# Patient Record
Sex: FEMALE | Race: WHITE | NOT HISPANIC OR LATINO | URBAN - METROPOLITAN AREA
[De-identification: names, ages, dates, MRNs, and addresses within clinical notes are randomized per-mention and may not be internally consistent; named-entity substitution may affect disease eponyms.]

---

## 2017-04-05 ENCOUNTER — EMERGENCY (EMERGENCY)
Facility: HOSPITAL | Age: 82
LOS: 1 days | Discharge: HOME | End: 2017-04-05
Attending: EMERGENCY MEDICINE | Admitting: EMERGENCY MEDICINE
Payer: MEDICARE

## 2017-04-05 VITALS
HEART RATE: 54 BPM | RESPIRATION RATE: 16 BRPM | SYSTOLIC BLOOD PRESSURE: 112 MMHG | OXYGEN SATURATION: 96 % | TEMPERATURE: 98 F | DIASTOLIC BLOOD PRESSURE: 62 MMHG

## 2017-04-05 VITALS
HEART RATE: 53 BPM | TEMPERATURE: 97 F | OXYGEN SATURATION: 99 % | RESPIRATION RATE: 17 BRPM | SYSTOLIC BLOOD PRESSURE: 116 MMHG | DIASTOLIC BLOOD PRESSURE: 72 MMHG

## 2017-04-05 DIAGNOSIS — Z98.89 OTHER SPECIFIED POSTPROCEDURAL STATES: Chronic | ICD-10-CM

## 2017-04-05 DIAGNOSIS — Z79.899 OTHER LONG TERM (CURRENT) DRUG THERAPY: ICD-10-CM

## 2017-04-05 DIAGNOSIS — Z04.3 ENCOUNTER FOR EXAMINATION AND OBSERVATION FOLLOWING OTHER ACCIDENT: ICD-10-CM

## 2017-04-05 DIAGNOSIS — I10 ESSENTIAL (PRIMARY) HYPERTENSION: ICD-10-CM

## 2017-04-05 LAB
ALBUMIN SERPL ELPH-MCNC: 3.4 G/DL — SIGNIFICANT CHANGE UP (ref 3.4–5)
ALP SERPL-CCNC: 77 U/L — SIGNIFICANT CHANGE UP (ref 40–120)
ALT FLD-CCNC: 21 U/L — SIGNIFICANT CHANGE UP (ref 12–42)
ANION GAP SERPL CALC-SCNC: 6 MMOL/L — LOW (ref 9–16)
AST SERPL-CCNC: 10 U/L — LOW (ref 15–37)
BASOPHILS NFR BLD AUTO: 0.2 % — SIGNIFICANT CHANGE UP (ref 0–2)
BILIRUB SERPL-MCNC: 0.3 MG/DL — SIGNIFICANT CHANGE UP (ref 0.2–1.2)
BUN SERPL-MCNC: 40 MG/DL — HIGH (ref 7–23)
CALCIUM SERPL-MCNC: 8.5 MG/DL — SIGNIFICANT CHANGE UP (ref 8.5–10.5)
CHLORIDE SERPL-SCNC: 105 MMOL/L — SIGNIFICANT CHANGE UP (ref 96–108)
CK MB CFR SERPL CALC: 2.1 NG/ML — SIGNIFICANT CHANGE UP (ref 0.5–3.6)
CK SERPL-CCNC: 74 U/L — SIGNIFICANT CHANGE UP (ref 26–192)
CO2 SERPL-SCNC: 27 MMOL/L — SIGNIFICANT CHANGE UP (ref 22–31)
CREAT SERPL-MCNC: 1.31 MG/DL — HIGH (ref 0.5–1.3)
EOSINOPHIL NFR BLD AUTO: 2.1 % — SIGNIFICANT CHANGE UP (ref 0–6)
GLUCOSE SERPL-MCNC: 86 MG/DL — SIGNIFICANT CHANGE UP (ref 70–99)
HCT VFR BLD CALC: 29.8 % — LOW (ref 34.5–45)
HGB BLD-MCNC: 10.2 G/DL — LOW (ref 11.5–15.5)
LYMPHOCYTES # BLD AUTO: 26.8 % — SIGNIFICANT CHANGE UP (ref 13–44)
MCHC RBC-ENTMCNC: 29 PG — SIGNIFICANT CHANGE UP (ref 27–34)
MCHC RBC-ENTMCNC: 34.2 G/DL — SIGNIFICANT CHANGE UP (ref 32–36)
MCV RBC AUTO: 84.7 FL — SIGNIFICANT CHANGE UP (ref 80–100)
MONOCYTES NFR BLD AUTO: 10.4 % — SIGNIFICANT CHANGE UP (ref 2–14)
NEUTROPHILS NFR BLD AUTO: 60.5 % — SIGNIFICANT CHANGE UP (ref 43–77)
PLATELET # BLD AUTO: 253 K/UL — SIGNIFICANT CHANGE UP (ref 150–400)
POTASSIUM SERPL-MCNC: 4.5 MMOL/L — SIGNIFICANT CHANGE UP (ref 3.5–5.3)
POTASSIUM SERPL-SCNC: 4.5 MMOL/L — SIGNIFICANT CHANGE UP (ref 3.5–5.3)
PROT SERPL-MCNC: 7.1 G/DL — SIGNIFICANT CHANGE UP (ref 6.4–8.2)
RBC # BLD: 3.52 M/UL — LOW (ref 3.8–5.2)
RBC # FLD: 13.3 % — SIGNIFICANT CHANGE UP (ref 10.3–16.9)
SODIUM SERPL-SCNC: 138 MMOL/L — SIGNIFICANT CHANGE UP (ref 135–145)
TROPONIN I SERPL-MCNC: <0.015 NG/ML — SIGNIFICANT CHANGE UP (ref 0.01–0.04)
WBC # BLD: 5.2 K/UL — SIGNIFICANT CHANGE UP (ref 3.8–10.5)
WBC # FLD AUTO: 5.2 K/UL — SIGNIFICANT CHANGE UP (ref 3.8–10.5)

## 2017-04-05 PROCEDURE — 99285 EMERGENCY DEPT VISIT HI MDM: CPT | Mod: 25

## 2017-04-05 PROCEDURE — 80053 COMPREHEN METABOLIC PANEL: CPT

## 2017-04-05 PROCEDURE — 84484 ASSAY OF TROPONIN QUANT: CPT

## 2017-04-05 PROCEDURE — 82550 ASSAY OF CK (CPK): CPT

## 2017-04-05 PROCEDURE — 93005 ELECTROCARDIOGRAM TRACING: CPT

## 2017-04-05 PROCEDURE — 85025 COMPLETE CBC W/AUTO DIFF WBC: CPT

## 2017-04-05 PROCEDURE — 36415 COLL VENOUS BLD VENIPUNCTURE: CPT

## 2017-04-05 PROCEDURE — 70450 CT HEAD/BRAIN W/O DYE: CPT

## 2017-04-05 PROCEDURE — 70450 CT HEAD/BRAIN W/O DYE: CPT | Mod: 26

## 2017-04-05 PROCEDURE — 93010 ELECTROCARDIOGRAM REPORT: CPT

## 2017-04-05 PROCEDURE — 99284 EMERGENCY DEPT VISIT MOD MDM: CPT | Mod: 25

## 2017-04-05 PROCEDURE — 82553 CREATINE MB FRACTION: CPT

## 2017-04-05 NOTE — ED ADULT TRIAGE NOTE - CHIEF COMPLAINT QUOTE
pt was sent to Er due to aid finding patient on the floor, EMS stated that patient was hypertensive on scene but patient has not history of htn and is not hypertensive now.

## 2017-04-05 NOTE — ED PROVIDER NOTE - MUSCULOSKELETAL, MLM
Spine appears normal, no midline spine ttp, no deformity of ext (baseline contracture R knee in flexion), range of motion is not limited, no muscle or joint tenderness

## 2017-04-05 NOTE — ED PROVIDER NOTE - OBJECTIVE STATEMENT
89 yo female h/o dementia, htn brought by hha who woke up and found pt sitting on the floor.  Pt unable to recall what happened.  Per hha, pt often slides off furniture to floor; no evidence of trauma.  HHA did not hear pt fall.  Pt denies injury - denies hitting head, ha, neck/back/ext pain or injury, cp, palpitations, sob, abd pain, n/v/d.  No fever, uri sx per hha.  MS at baseline per hha.

## 2017-04-05 NOTE — ED PROVIDER NOTE - MEDICAL DECISION MAKING DETAILS
Pt found on floor, likely slid off furniture.  Pt denies chi but will check ct head since pt w dementia and poor recall.  Labs, ekg wnl.  No concern for cardiac event, infection.  If ct head neg, plan dc home to f/u pmd.

## 2017-04-05 NOTE — ED ADULT NURSE NOTE - OBJECTIVE STATEMENT
89 y/o female c/o fall today when trying to get into wheelchair. pt's aide reports finding pt on the floor. pt PMH dementia and unable to provide medical history. pt oriented to person and place, disoriented to time. pt denies any dizziness, weakness, numbness/tingling, n/v/d, chest pain, SOB, headaches, LOC, head injury. no acute distress, VS stable, resting comfortably in stretcher. EKG done and MD reviewed. continue to monitor.

## 2017-04-05 NOTE — ED ADULT NURSE NOTE - CHPI ED SYMPTOMS NEG
no bleeding/no numbness/no loss of consciousness/no tingling/no confusion/no weakness/no abrasion/no deformity/no fever/no vomiting

## 2019-08-07 ENCOUNTER — EMERGENCY (EMERGENCY)
Facility: HOSPITAL | Age: 84
LOS: 1 days | Discharge: HOME | End: 2019-08-07
Attending: EMERGENCY MEDICINE | Admitting: EMERGENCY MEDICINE
Payer: MEDICARE

## 2019-08-07 VITALS
OXYGEN SATURATION: 99 % | RESPIRATION RATE: 16 BRPM | DIASTOLIC BLOOD PRESSURE: 64 MMHG | HEART RATE: 64 BPM | TEMPERATURE: 98 F | SYSTOLIC BLOOD PRESSURE: 146 MMHG

## 2019-08-07 VITALS
DIASTOLIC BLOOD PRESSURE: 82 MMHG | OXYGEN SATURATION: 100 % | SYSTOLIC BLOOD PRESSURE: 161 MMHG | HEART RATE: 60 BPM | TEMPERATURE: 98 F | RESPIRATION RATE: 18 BRPM

## 2019-08-07 DIAGNOSIS — Z98.89 OTHER SPECIFIED POSTPROCEDURAL STATES: Chronic | ICD-10-CM

## 2019-08-07 LAB
ALBUMIN SERPL ELPH-MCNC: 4 G/DL — SIGNIFICANT CHANGE UP (ref 3.3–5)
ALP SERPL-CCNC: 55 U/L — SIGNIFICANT CHANGE UP (ref 40–120)
ALT FLD-CCNC: 10 U/L — SIGNIFICANT CHANGE UP (ref 10–45)
ANION GAP SERPL CALC-SCNC: 10 MMOL/L — SIGNIFICANT CHANGE UP (ref 5–17)
ANION GAP SERPL CALC-SCNC: 11 MMOL/L — SIGNIFICANT CHANGE UP (ref 5–17)
APPEARANCE UR: ABNORMAL
AST SERPL-CCNC: 16 U/L — SIGNIFICANT CHANGE UP (ref 10–40)
BASOPHILS # BLD AUTO: 0.03 K/UL — SIGNIFICANT CHANGE UP (ref 0–0.2)
BASOPHILS NFR BLD AUTO: 0.7 % — SIGNIFICANT CHANGE UP (ref 0–2)
BILIRUB SERPL-MCNC: 0.6 MG/DL — SIGNIFICANT CHANGE UP (ref 0.2–1.2)
BILIRUB UR-MCNC: NEGATIVE — SIGNIFICANT CHANGE UP
BUN SERPL-MCNC: 30 MG/DL — HIGH (ref 7–23)
BUN SERPL-MCNC: 31 MG/DL — HIGH (ref 7–23)
CALCIUM SERPL-MCNC: 8.7 MG/DL — SIGNIFICANT CHANGE UP (ref 8.4–10.5)
CALCIUM SERPL-MCNC: 9.5 MG/DL — SIGNIFICANT CHANGE UP (ref 8.4–10.5)
CHLORIDE SERPL-SCNC: 110 MMOL/L — HIGH (ref 96–108)
CHLORIDE SERPL-SCNC: 112 MMOL/L — HIGH (ref 96–108)
CO2 SERPL-SCNC: 23 MMOL/L — SIGNIFICANT CHANGE UP (ref 22–31)
CO2 SERPL-SCNC: 26 MMOL/L — SIGNIFICANT CHANGE UP (ref 22–31)
COLOR SPEC: YELLOW — SIGNIFICANT CHANGE UP
CREAT SERPL-MCNC: 0.93 MG/DL — SIGNIFICANT CHANGE UP (ref 0.5–1.3)
CREAT SERPL-MCNC: 1.04 MG/DL — SIGNIFICANT CHANGE UP (ref 0.5–1.3)
DIFF PNL FLD: ABNORMAL
EOSINOPHIL # BLD AUTO: 0.05 K/UL — SIGNIFICANT CHANGE UP (ref 0–0.5)
EOSINOPHIL NFR BLD AUTO: 1.1 % — SIGNIFICANT CHANGE UP (ref 0–6)
EXTRA BLUE TOP TUBE: SIGNIFICANT CHANGE UP
EXTRA SST TUBE: SIGNIFICANT CHANGE UP
GAS PNL BLDV: SIGNIFICANT CHANGE UP
GLUCOSE SERPL-MCNC: 88 MG/DL — SIGNIFICANT CHANGE UP (ref 70–99)
GLUCOSE SERPL-MCNC: 95 MG/DL — SIGNIFICANT CHANGE UP (ref 70–99)
GLUCOSE UR QL: NEGATIVE — SIGNIFICANT CHANGE UP
HCT VFR BLD CALC: 31 % — LOW (ref 34.5–45)
HGB BLD-MCNC: 10.3 G/DL — LOW (ref 11.5–15.5)
IMM GRANULOCYTES NFR BLD AUTO: 0.2 % — SIGNIFICANT CHANGE UP (ref 0–1.5)
KETONES UR-MCNC: NEGATIVE — SIGNIFICANT CHANGE UP
LEUKOCYTE ESTERASE UR-ACNC: ABNORMAL
LYMPHOCYTES # BLD AUTO: 1.18 K/UL — SIGNIFICANT CHANGE UP (ref 1–3.3)
LYMPHOCYTES # BLD AUTO: 27.1 % — SIGNIFICANT CHANGE UP (ref 13–44)
MCHC RBC-ENTMCNC: 29.3 PG — SIGNIFICANT CHANGE UP (ref 27–34)
MCHC RBC-ENTMCNC: 33.2 GM/DL — SIGNIFICANT CHANGE UP (ref 32–36)
MCV RBC AUTO: 88.1 FL — SIGNIFICANT CHANGE UP (ref 80–100)
MONOCYTES # BLD AUTO: 0.48 K/UL — SIGNIFICANT CHANGE UP (ref 0–0.9)
MONOCYTES NFR BLD AUTO: 11 % — SIGNIFICANT CHANGE UP (ref 2–14)
NEUTROPHILS # BLD AUTO: 2.6 K/UL — SIGNIFICANT CHANGE UP (ref 1.8–7.4)
NEUTROPHILS NFR BLD AUTO: 59.9 % — SIGNIFICANT CHANGE UP (ref 43–77)
NITRITE UR-MCNC: POSITIVE
NRBC # BLD: 0 /100 WBCS — SIGNIFICANT CHANGE UP (ref 0–0)
PH UR: 6 — SIGNIFICANT CHANGE UP (ref 5–8)
PLATELET # BLD AUTO: 242 K/UL — SIGNIFICANT CHANGE UP (ref 150–400)
POTASSIUM SERPL-MCNC: 4 MMOL/L — SIGNIFICANT CHANGE UP (ref 3.5–5.3)
POTASSIUM SERPL-MCNC: 4.4 MMOL/L — SIGNIFICANT CHANGE UP (ref 3.5–5.3)
POTASSIUM SERPL-SCNC: 4 MMOL/L — SIGNIFICANT CHANGE UP (ref 3.5–5.3)
POTASSIUM SERPL-SCNC: 4.4 MMOL/L — SIGNIFICANT CHANGE UP (ref 3.5–5.3)
PROT SERPL-MCNC: 7.4 G/DL — SIGNIFICANT CHANGE UP (ref 6–8.3)
PROT UR-MCNC: NEGATIVE MG/DL — SIGNIFICANT CHANGE UP
RBC # BLD: 3.52 M/UL — LOW (ref 3.8–5.2)
RBC # FLD: 13.9 % — SIGNIFICANT CHANGE UP (ref 10.3–14.5)
SODIUM SERPL-SCNC: 145 MMOL/L — SIGNIFICANT CHANGE UP (ref 135–145)
SODIUM SERPL-SCNC: 147 MMOL/L — HIGH (ref 135–145)
SP GR SPEC: 1.01 — SIGNIFICANT CHANGE UP (ref 1–1.03)
TROPONIN T SERPL-MCNC: 0.02 NG/ML — HIGH (ref 0–0.01)
UROBILINOGEN FLD QL: 0.2 E.U./DL — SIGNIFICANT CHANGE UP
WBC # BLD: 4.35 K/UL — SIGNIFICANT CHANGE UP (ref 3.8–10.5)
WBC # FLD AUTO: 4.35 K/UL — SIGNIFICANT CHANGE UP (ref 3.8–10.5)

## 2019-08-07 PROCEDURE — 99285 EMERGENCY DEPT VISIT HI MDM: CPT | Mod: 25

## 2019-08-07 PROCEDURE — 80048 BASIC METABOLIC PNL TOTAL CA: CPT

## 2019-08-07 PROCEDURE — 96374 THER/PROPH/DIAG INJ IV PUSH: CPT | Mod: XU

## 2019-08-07 PROCEDURE — 82962 GLUCOSE BLOOD TEST: CPT

## 2019-08-07 PROCEDURE — 70450 CT HEAD/BRAIN W/O DYE: CPT | Mod: 26,59

## 2019-08-07 PROCEDURE — 87186 SC STD MICRODIL/AGAR DIL: CPT

## 2019-08-07 PROCEDURE — 82330 ASSAY OF CALCIUM: CPT

## 2019-08-07 PROCEDURE — 93005 ELECTROCARDIOGRAM TRACING: CPT

## 2019-08-07 PROCEDURE — 82803 BLOOD GASES ANY COMBINATION: CPT

## 2019-08-07 PROCEDURE — 85025 COMPLETE CBC W/AUTO DIFF WBC: CPT

## 2019-08-07 PROCEDURE — 36415 COLL VENOUS BLD VENIPUNCTURE: CPT

## 2019-08-07 PROCEDURE — 87184 SC STD DISK METHOD PER PLATE: CPT

## 2019-08-07 PROCEDURE — 71045 X-RAY EXAM CHEST 1 VIEW: CPT | Mod: 26

## 2019-08-07 PROCEDURE — 87086 URINE CULTURE/COLONY COUNT: CPT

## 2019-08-07 PROCEDURE — 0042T: CPT

## 2019-08-07 PROCEDURE — 99285 EMERGENCY DEPT VISIT HI MDM: CPT

## 2019-08-07 PROCEDURE — 70496 CT ANGIOGRAPHY HEAD: CPT

## 2019-08-07 PROCEDURE — 70450 CT HEAD/BRAIN W/O DYE: CPT

## 2019-08-07 PROCEDURE — 84484 ASSAY OF TROPONIN QUANT: CPT

## 2019-08-07 PROCEDURE — 80053 COMPREHEN METABOLIC PANEL: CPT

## 2019-08-07 PROCEDURE — 81001 URINALYSIS AUTO W/SCOPE: CPT

## 2019-08-07 PROCEDURE — 84132 ASSAY OF SERUM POTASSIUM: CPT

## 2019-08-07 PROCEDURE — 70498 CT ANGIOGRAPHY NECK: CPT | Mod: 26

## 2019-08-07 PROCEDURE — 93010 ELECTROCARDIOGRAM REPORT: CPT

## 2019-08-07 PROCEDURE — 70498 CT ANGIOGRAPHY NECK: CPT

## 2019-08-07 PROCEDURE — 71045 X-RAY EXAM CHEST 1 VIEW: CPT

## 2019-08-07 PROCEDURE — 70496 CT ANGIOGRAPHY HEAD: CPT | Mod: 26

## 2019-08-07 PROCEDURE — 84295 ASSAY OF SERUM SODIUM: CPT

## 2019-08-07 PROCEDURE — 80061 LIPID PANEL: CPT

## 2019-08-07 RX ORDER — ASPIRIN/CALCIUM CARB/MAGNESIUM 324 MG
1 TABLET ORAL
Qty: 30 | Refills: 0
Start: 2019-08-07 | End: 2019-09-05

## 2019-08-07 RX ORDER — SODIUM CHLORIDE 9 MG/ML
1000 INJECTION INTRAMUSCULAR; INTRAVENOUS; SUBCUTANEOUS ONCE
Refills: 0 | Status: COMPLETED | OUTPATIENT
Start: 2019-08-07 | End: 2019-08-07

## 2019-08-07 RX ORDER — CEFTRIAXONE 500 MG/1
1000 INJECTION, POWDER, FOR SOLUTION INTRAMUSCULAR; INTRAVENOUS ONCE
Refills: 0 | Status: COMPLETED | OUTPATIENT
Start: 2019-08-07 | End: 2019-08-07

## 2019-08-07 RX ORDER — CEFPODOXIME PROXETIL 100 MG
1 TABLET ORAL
Qty: 14 | Refills: 0
Start: 2019-08-07 | End: 2019-08-13

## 2019-08-07 RX ADMIN — CEFTRIAXONE 100 MILLIGRAM(S): 500 INJECTION, POWDER, FOR SOLUTION INTRAMUSCULAR; INTRAVENOUS at 18:03

## 2019-08-07 RX ADMIN — SODIUM CHLORIDE 1000 MILLILITER(S): 9 INJECTION INTRAMUSCULAR; INTRAVENOUS; SUBCUTANEOUS at 16:48

## 2019-08-07 NOTE — CONSULT NOTE ADULT - SUBJECTIVE AND OBJECTIVE BOX
**STROKE CODE CONSULT NOTE**  **INCOMPLETE**    Last known well time/Time of onset of symptoms: 10:00 AM    HPI: 89y Female with PMHx of     T(C): 36.9 (08-07-19 @ 13:40), Max: 36.9 (08-07-19 @ 13:40)  HR: 60 (08-07-19 @ 15:31) (60 - 60)  BP: 147/56 (08-07-19 @ 15:31) (147/56 - 161/82)  RR: 16 (08-07-19 @ 15:31) (16 - 18)  SpO2: 100% (08-07-19 @ 15:31) (100% - 100%)    PAST MEDICAL & SURGICAL HISTORY:  Dementia without behavioral disturbance, unspecified dementia type  Essential hypertension  H/O knee surgery      FAMILY HISTORY:      SOCIAL HISTORY:  Denies smoking, drinking, or drug use    ROS: ***  Constitutional: No fever, weight loss or fatigue  Eyes: No eye pain, visual disturbances, or discharge  ENMT:  No difficulty hearing, tinnitus, vertigo; No sinus or throat pain  Neck: No pain or stiffness  Respiratory: No cough, wheezing, chills or hemoptysis  Cardiovascular: No chest pain, palpitations, shortness of breath, dizziness or leg swelling  Gastrointestinal: No abdominal pain. No nausea, vomiting or hematemesis; No diarrhea or constipation. No hematochezia.  Genitourinary: No dysuria, frequency, hematuria or incontinence  Neurological: As per HPI  Skin: No itching, burning, rashes or lesions   Endocrine: No heat or cold intolerance; No hair loss  Musculoskeletal: No joint pain or swelling; No muscle, back or extremity pain  Psychiatric: No depression, anxiety, mood swings or difficulty sleeping  Heme/Lymph: No easy bruising or bleeding gums    MEDICATIONS  (STANDING):    MEDICATIONS  (PRN):    Allergies    No Known Allergies    Intolerances      Vital Signs Last 24 Hrs  T(C): 36.9 (07 Aug 2019 13:40), Max: 36.9 (07 Aug 2019 13:40)  T(F): 98.4 (07 Aug 2019 13:40), Max: 98.4 (07 Aug 2019 13:40)  HR: 60 (07 Aug 2019 15:31) (60 - 60)  BP: 147/56 (07 Aug 2019 15:31) (147/56 - 161/82)  BP(mean): --  RR: 16 (07 Aug 2019 15:31) (16 - 18)  SpO2: 100% (07 Aug 2019 15:31) (100% - 100%)    Physical exam:  General: No acute distress, awake and alert  Cardiovascular: Regular rate and rhythm, no murmurs, rubs, or gallops. No bruits  Pulmonary: Anterior breath sounds clear bilaterally, no crackles or wheezing. No use of accessory muscles  Extremities: Radial and DP pulses +2, no edema    Neurologic:  -Mental status: Awake, alert, oriented to person, place, and time. Speech is fluent with intact naming, repetition, and comprehension, no dysarthria. Recent and remote memory intact. Follows commands. Attention/concentration intact. Fund of knowledge appropriate.  -Cranial nerves:   II: Visual fields are full to confrontation.  III, IV, VI: Extraocular movements are intact without nystagmus. Pupils equally round and reactive to light  V:  Facial sensation V1-V3 equal and intact   VII: Face is symmetric with normal eye closure and smile  VIII: Hearing is bilaterally intact to finger rub  IX, X: Uvula is midline and soft palate rises symmetrically  XI: Head turning and shoulder shrug are intact.  XII: Tongue protrudes midline  Motor: Normal bulk and tone. No pronator drift. Strength bilateral upper extremity 5/5, bilateral lower extremities 5/5.  Rapid alternating movements intact and symmetric  Sensation: Intact to light touch bilaterally. No neglect or extinction on double simultaneous testing.  Coordination: No dysmetria on finger-to-nose and heel-to-shin bilaterally  Reflexes: Downgoing toes bilaterally   Gait: Narrow gait and steady    NIHSS: ****    Fingerstick Blood Glucose: CAPILLARY BLOOD GLUCOSE      POCT Blood Glucose.: 85 mg/dL (07 Aug 2019 14:36)    LABS:                        10.3   4.35  )-----------( 242      ( 07 Aug 2019 14:46 )             31.0     08-07    147<H>  |  110<H>  |  31<H>  ----------------------------<  88  4.4   |  26  |  1.04    Ca    9.5      07 Aug 2019 14:46    TPro  7.4  /  Alb  4.0  /  TBili  0.6  /  DBili  x   /  AST  16  /  ALT  10  /  AlkPhos  55  08-07              RADIOLOGY & ADDITIONAL STUDIES:    HCT:    CTA:    CTP:      -----------------------------------------------------------------------------------------------------------------  IV-tPA (Y/N):    ***                              Bolus time:  Reason IV-tPA not given: ***    ASSESSMENT/PLAN:    89y Female w/ PMH ***. HCT showed ***. CTA showed ***. Given *** tPA was ***.         Patient's symptoms are likely not related to TIA/CVA. Given history of old stroke, recommend aspirin 81mg.   Symptoms likely c/w acute encephalopathy vs. underlying dementia. **STROKE CODE CONSULT NOTE**    Last known well time/Time of onset of symptoms: 10:00 AM    HPI: 89y Female with PMHx of Dementia (alert and oriented x1 (self) at baseline), HTN (on HCTZ, Lisinopril), CKD, insomnia, mixed incontinence, depression, anxiety, thoracic scoliosis, cognitive disorder, wheelchair/bedbound, presented to the ED after being woken up by physical therapist with R sided facial droop which was transient for ~5 minutes. Last known normal time noted to be 10:00AM this morning.  Per HHA, patient is alert and oriented x 1 at baseline and did not recognize any changes in her face or any signs of facial droop.  ROS unable to obtain from patient.     In th ED, Vitals were   T(C): 36.9 (08-07-19 @ 13:40), Max: 36.9 (08-07-19 @ 13:40)  HR: 60 (08-07-19 @ 15:31) (60 - 60)  BP: 147/56 (08-07-19 @ 15:31) (147/56 - 161/82)  RR: 16 (08-07-19 @ 15:31) (16 - 18)  SpO2: 100% (08-07-19 @ 15:31) (100% - 100%)    PAST MEDICAL & SURGICAL HISTORY:  Dementia without behavioral disturbance, unspecified dementia type  Essential hypertension  H/O knee surgery      FAMILY HISTORY:      SOCIAL HISTORY:  Denies smoking, drinking, or drug use    ROS: unable to obtain secondary to baseline mental status.     MEDICATIONS  (STANDING):  MEDICATIONS  (PRN):    Allergies    No Known Allergies    Intolerances      Vital Signs Last 24 Hrs  T(C): 36.9 (07 Aug 2019 13:40), Max: 36.9 (07 Aug 2019 13:40)  T(F): 98.4 (07 Aug 2019 13:40), Max: 98.4 (07 Aug 2019 13:40)  HR: 60 (07 Aug 2019 15:31) (60 - 60)  BP: 147/56 (07 Aug 2019 15:31) (147/56 - 161/82)  BP(mean): --  RR: 16 (07 Aug 2019 15:31) (16 - 18)  SpO2: 100% (07 Aug 2019 15:31) (100% - 100%)    Physical exam:  General: No acute distress, awake and alert, elderly frail female  Cardiovascular: Regular rate and rhythm, no murmurs, rubs, or gallops. No bruits  Pulmonary: Anterior breath sounds clear bilaterally, no crackles or wheezing. No use of accessory muscles  Extremities: Radial and DP pulses +2, no edema    Neurologic:  -Mental status: Awake, alert, oriented to self, no dysarthria. intermittently follows commands, not always cooperative.   -Cranial nerves:   II, III, IV, VI: Extraocular movements are intact without nystagmus. Pupils equally round and reactive to light  V:  Facial sensation V1-V3 equal and intact   VII: Face is symmetric with normal eye closure and smile  VIII: Hearing is bilaterally intact to finger rub  IX, X: Uvula is midline and soft palate rises symmetrically  XI: Head turning and shoulder shrug are intact.  XII: Tongue protrudes midline  Motor: No pronator drift. Strength bilateral upper extremity 5/5, moves bilateral lower extremities, however has baseline contractures   Sensation: Intact to light touch bilaterally. No neglect or extinction on double simultaneous testing.  Coordination: unable to assess, pt intermittently follows commands   Reflexes: Downgoing toes bilaterally   Gait: deferred     NIHSS: 9    Fingerstick Blood Glucose: CAPILLARY BLOOD GLUCOSE      POCT Blood Glucose.: 85 mg/dL (07 Aug 2019 14:36)    LABS:                        10.3   4.35  )-----------( 242      ( 07 Aug 2019 14:46 )             31.0     08-07    147<H>  |  110<H>  |  31<H>  ----------------------------<  88  4.4   |  26  |  1.04    Ca    9.5      07 Aug 2019 14:46    TPro  7.4  /  Alb  4.0  /  TBili  0.6  /  DBili  x   /  AST  16  /  ALT  10  /  AlkPhos  55  08-07      RADIOLOGY & ADDITIONAL STUDIES:    HCT: No intracranial hemorrhage or acute transcortical infarct. 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     CTA: no stenoses or occlusion seen in carotid or vertebral arteries     CTP:   normal CT perfusion study     -----------------------------------------------------------------------------------------------------------------  IV-tPA (Y/N):    N                              Bolus time:  Reason IV-tPA not given: Pt out of the window    ASSESSMENT/PLAN:    89y Female w/ PMH ***. HCT showed ***. CTA showed ***. Given *** tPA was ***.         Patient's symptoms are likely not related to TIA/CVA. Given history of old stroke, recommend aspirin 81mg.   Symptoms likely c/w acute encephalopathy vs. underlying dementia. **STROKE CODE CONSULT NOTE**    Last known well time/Time of onset of symptoms: 10:00 AM    HPI: 89y Female with PMHx of Dementia (alert and oriented x1 (self) at baseline), HTN (on HCTZ, Lisinopril), CKD, insomnia, mixed incontinence, depression, anxiety, thoracic scoliosis, cognitive disorder, wheelchair/bedbound, presented to the ED after being woken up by physical therapist with R sided facial droop which was transient for ~5 minutes. Last known normal time noted to be 10:00AM this morning. No other focal deficits noted. Per HHA, patient is alert and oriented x 1 at baseline and did not recognize any changes in her face or any signs of facial droop.  ROS unable to obtain from patient, pt unreliable history 2/2 pt's history of dementia. Upon arrival, pt's BP was 147/65, FS. Stroke code was called upon  arrival to the ED. Stroke team arrived and patient brought to CT. CT head without contrast negative for any acute infarct, however does have old R sided capsular insular infarct. CTP normal, and CT angio without LVO or carotid occlusion.     In th ED, Vitals were   T(C): 36.9 (19 @ 13:40), Max: 36.9 (19 @ 13:40)  HR: 60 (19 @ 15:31) (60 - 60)  BP: 147/56 (19 @ 15:31) (147/56 - 161/82)  RR: 16 (19 @ 15:31) (16 - 18)  SpO2: 100% (19 @ 15:31) (100% - 100%)    PAST MEDICAL & SURGICAL HISTORY:  Dementia without behavioral disturbance, unspecified dementia type  Essential hypertension  H/O knee surgery      FAMILY HISTORY:      SOCIAL HISTORY:  Denies smoking, drinking, or drug use    ROS: unable to obtain secondary to baseline mental status.     MEDICATIONS  (STANDING):  MEDICATIONS  (PRN):    Allergies    No Known Allergies    Intolerances      Vital Signs Last 24 Hrs  T(C): 36.9 (07 Aug 2019 13:40), Max: 36.9 (07 Aug 2019 13:40)  T(F): 98.4 (07 Aug 2019 13:40), Max: 98.4 (07 Aug 2019 13:40)  HR: 60 (07 Aug 2019 15:31) (60 - 60)  BP: 147/56 (07 Aug 2019 15:31) (147/56 - 161/82)  BP(mean): --  RR: 16 (07 Aug 2019 15:31) (16 - 18)  SpO2: 100% (07 Aug 2019 15:31) (100% - 100%)    Physical exam:  General: No acute distress, awake and alert, elderly frail female  Cardiovascular: Regular rate and rhythm, no murmurs, rubs, or gallops. No bruits  Pulmonary: Anterior breath sounds clear bilaterally, no crackles or wheezing. No use of accessory muscles  Extremities: Radial and DP pulses +2, no edema    Neurologic:  -Mental status: Awake, alert, oriented to self, no dysarthria. intermittently follows commands, not always cooperative.   -Cranial nerves:   II, III, IV, VI: Extraocular movements are intact without nystagmus. Pupils equally round and reactive to light  V:  Facial sensation V1-V3 equal and intact   VII: Face is symmetric with normal eye closure and smile  VIII: Hearing is bilaterally intact to finger rub  IX, X: Uvula is midline and soft palate rises symmetrically  XI: Head turning and shoulder shrug are intact.  XII: Tongue protrudes midline  Motor: No pronator drift. Strength bilateral upper extremity 5/5, moves bilateral lower extremities, however has baseline contractures   Sensation: Intact to light touch bilaterally. No neglect or extinction on double simultaneous testing.  Coordination: unable to assess, pt intermittently follows commands   Reflexes: Downgoing toes bilaterally   Gait: deferred     NIHSS: 9    Fingerstick Blood Glucose: CAPILLARY BLOOD GLUCOSE      POCT Blood Glucose.: 85 mg/dL (07 Aug 2019 14:36)    LABS:                        10.3   4.35  )-----------( 242      ( 07 Aug 2019 14:46 )             31.0     08-07    147<H>  |  110<H>  |  31<H>  ----------------------------<  88  4.4   |  26  |  1.04    Ca    9.5      07 Aug 2019 14:46    TPro  7.4  /  Alb  4.0  /  TBili  0.6  /  DBili  x   /  AST  16  /  ALT  10  /  AlkPhos  55        RADIOLOGY & ADDITIONAL STUDIES:    HCT: No intracranial hemorrhage or acute transcortical infarct. 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     CTA: no stenoses or occlusion seen in carotid or vertebral arteries     CTP:   normal CT perfusion study     -----------------------------------------------------------------------------------------------------------------  IV-tPA (Y/N):    N                              Bolus time:  Reason IV-tPA not given: Pt out of the window    ASSESSMENT/PLAN:    89y Female w/ PMH ***. HCT showed ***. CTA showed ***. Given *** tPA was ***.         Patient's symptoms are likely not related to TIA/CVA. Given history of old stroke, recommend aspirin 81mg.   Symptoms likely c/w acute encephalopathy vs. underlying dementia. **STROKE CODE CONSULT NOTE**    Last known well time/Time of onset of symptoms: 10:00 AM    HPI: 89y Female with PMHx of Dementia (alert and oriented x1 (self) at baseline), HTN (on HCTZ, Lisinopril), CKD, insomnia, mixed incontinence, depression, anxiety, thoracic scoliosis, cognitive disorder, wheelchair/bedbound, presented to the ED after being woken up by physical therapist with R sided facial droop which was transient for ~5 minutes. Last known normal time noted to be 10:00AM this morning. No other focal deficits noted. Per HHA, patient is alert and oriented x 1 at baseline and did not recognize any changes in her face or any signs of facial droop.  ROS unable to obtain from patient, pt unreliable history 2/2 pt's history of dementia. Upon arrival, pt's BP was 147/65, FS. Stroke code was called upon  arrival to the ED. Stroke team arrived and patient brought to CT. CT head without contrast negative for any acute infarct, however does have old R sided capsular insular infarct. CTP normal, and CT angio without LVO or carotid occlusion.     In th ED, Vitals were   T(C): 36.9 (19 @ 13:40), Max: 36.9 (19 @ 13:40)  HR: 60 (19 @ 15:31) (60 - 60)  BP: 147/56 (19 @ 15:31) (147/56 - 161/82)  RR: 16 (19 @ 15:31) (16 - 18)  SpO2: 100% (19 @ 15:31) (100% - 100%)    PAST MEDICAL & SURGICAL HISTORY:  Dementia without behavioral disturbance, unspecified dementia type  Essential hypertension  H/O knee surgery      FAMILY HISTORY:      SOCIAL HISTORY:  Denies smoking, drinking, or drug use    ROS: unable to obtain secondary to baseline mental status.     MEDICATIONS  (STANDING):  MEDICATIONS  (PRN):    Allergies    No Known Allergies    Intolerances      Vital Signs Last 24 Hrs  T(C): 36.9 (07 Aug 2019 13:40), Max: 36.9 (07 Aug 2019 13:40)  T(F): 98.4 (07 Aug 2019 13:40), Max: 98.4 (07 Aug 2019 13:40)  HR: 60 (07 Aug 2019 15:31) (60 - 60)  BP: 147/56 (07 Aug 2019 15:31) (147/56 - 161/82)  BP(mean): --  RR: 16 (07 Aug 2019 15:31) (16 - 18)  SpO2: 100% (07 Aug 2019 15:31) (100% - 100%)    Physical exam:  General: No acute distress, awake and alert, elderly frail female  Cardiovascular: Regular rate and rhythm, no murmurs, rubs, or gallops. No bruits  Pulmonary: Anterior breath sounds clear bilaterally, no crackles or wheezing. No use of accessory muscles  Extremities: Radial and DP pulses +2, no edema    Neurologic:  -Mental status: Awake, alert, oriented to self, no dysarthria. intermittently follows commands, not always cooperative.   -Cranial nerves:   II, III, IV, VI: Extraocular movements are intact without nystagmus. Pupils equally round and reactive to light  V:  Facial sensation V1-V3 equal and intact   VII: Face is symmetric with normal eye closure and smile  VIII: Hearing is bilaterally intact to finger rub  IX, X: Uvula is midline and soft palate rises symmetrically  XI: Head turning and shoulder shrug are intact.  XII: Tongue protrudes midline  Motor: No pronator drift. Strength bilateral upper extremity 5/5, moves bilateral lower extremities, however has baseline contractures   Sensation: Intact to light touch bilaterally. No neglect or extinction on double simultaneous testing.  Coordination: unable to assess, pt intermittently follows commands   Reflexes: Downgoing toes bilaterally   Gait: deferred     NIHSS: 9    Fingerstick Blood Glucose: CAPILLARY BLOOD GLUCOSE      POCT Blood Glucose.: 85 mg/dL (07 Aug 2019 14:36)    LABS:                        10.3   4.35  )-----------( 242      ( 07 Aug 2019 14:46 )             31.0     08-07    147<H>  |  110<H>  |  31<H>  ----------------------------<  88  4.4   |  26  |  1.04    Ca    9.5      07 Aug 2019 14:46    TPro  7.4  /  Alb  4.0  /  TBili  0.6  /  DBili  x   /  AST  16  /  ALT  10  /  AlkPhos  55        RADIOLOGY & ADDITIONAL STUDIES:    HCT: No intracranial hemorrhage or acute transcortical infarct. 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     CTA: no stenoses or occlusion seen in carotid or vertebral arteries     CTP:   normal CT perfusion study     -----------------------------------------------------------------------------------------------------------------  IV-tPA (Y/N):    N                              Bolus time:  Reason IV-tPA not given: Pt out of the window    ASSESSMENT/PLAN:    89y Female w/ PMH HTN, CKD, thoracic scoliosis, dementia (alert and oriented x1 at baseline), depression/anxiety, insomnia, presenting with transient R sided facial droop. HCT showed no evidence of acute infarct or hemorrhage, however did show old R sided capsular lacunar infarct. CTA showed no evidence of occlusion or stenosis. tPA was not given, as patient was outside of the window. Patient's symptoms are likely not related to TIA/CVA. Given history of old stroke, recommend starting aspirin 81mg.   Symptoms likely c/w acute encephalopathy vs. underlying dementia.   - management and dispo per ED. **STROKE CODE CONSULT NOTE**    Last known well time/Time of onset of symptoms: 10:00 AM    HPI: 89y Female with PMHx of Dementia (alert and oriented x1 (self) at baseline), HTN (on HCTZ, Lisinopril), CKD, insomnia, mixed incontinence, depression, anxiety, thoracic scoliosis, cognitive disorder, wheelchair/bedbound, presented to the ED after being woken up by physical therapist with R sided facial droop which was transient for ~5 minutes. Last known normal time noted to be 10:00AM this morning. No other focal deficits noted. Per HHA, patient is alert and oriented x 1 at baseline and did not recognize any changes in her face or any signs of facial droop.  ROS unable to obtain from patient, pt unreliable history 2/2 pt's history of dementia. Upon arrival, pt's BP was 147/65, FS. Stroke code was called upon  arrival to the ED. Stroke team arrived and patient brought to CT. CT head without contrast negative for any acute infarct, however does have old R sided capsular insular infarct. CTP normal, and CT angio without LVO or carotid occlusion.     In th ED, Vitals were   T(C): 36.9 (19 @ 13:40), Max: 36.9 (19 @ 13:40)  HR: 60 (19 @ 15:31) (60 - 60)  BP: 147/56 (19 @ 15:31) (147/56 - 161/82)  RR: 16 (19 @ 15:31) (16 - 18)  SpO2: 100% (19 @ 15:31) (100% - 100%)    PAST MEDICAL & SURGICAL HISTORY:  Dementia without behavioral disturbance, unspecified dementia type  Essential hypertension  H/O knee surgery      FAMILY HISTORY:  non-contributory    SOCIAL HISTORY:  Denies smoking, drinking, or drug use    ROS: unable to obtain secondary to baseline mental status.     MEDICATIONS  (STANDING):  MEDICATIONS  (PRN):    Allergies  No Known Allergies      Vital Signs Last 24 Hrs  T(C): 36.9 (07 Aug 2019 13:40), Max: 36.9 (07 Aug 2019 13:40)  T(F): 98.4 (07 Aug 2019 13:40), Max: 98.4 (07 Aug 2019 13:40)  HR: 60 (07 Aug 2019 15:31) (60 - 60)  BP: 147/56 (07 Aug 2019 15:31) (147/56 - 161/82)  RR: 16 (07 Aug 2019 15:31) (16 - 18)  SpO2: 100% (07 Aug 2019 15:31) (100% - 100%)    Physical exam:  General: No acute distress, awake and alert, elderly frail female  Cardiovascular: Regular rate and rhythm, no murmurs, rubs, or gallops. No bruits  Pulmonary: Anterior breath sounds clear bilaterally, no crackles or wheezing. No use of accessory muscles  Extremities: Radial and DP pulses +2, no edema    Neurologic:  -Mental status: Awake, alert, oriented to self only (not month or age or location), no dysarthria. able name 'new watch' and some other objects, intermittently follows commands - needs encouragement to show two fingers, not always cooperative.   -Cranial nerves:   II, III, IV, VI: Extraocular movements are intact without nystagmus. Pupils equally round and reactive to light  V:  Facial sensation V1-V3 equal and intact   VII: Face is symmetric with normal eye closure and smile  IX, X: Uvula is midline and soft palate rises symmetrically  XI: Head turning and shoulder shrug are intact.  XII: Tongue protrudes midline  Motor: No pronator drift. Strength - able raise both arms to count of 10, not cooperative with individual muscle testing, moves bilateral lower extremities (mainly toes), however has baseline contractures   Sensation: Responds to pain similarly both sides  Coordination: uncooperative, seems to touch her nose without dysmetria  Reflexes: Downgoing toes bilaterally   Gait: deferred     NIHSS: 9      Fingerstick Blood Glucose: CAPILLARY BLOOD GLUCOSE  POCT Blood Glucose.: 85 mg/dL (07 Aug 2019 14:36)    LABS:                        10.3   4.35  )-----------( 242      ( 07 Aug 2019 14:46 )             31.0     08-07    147<H>  |  110<H>  |  31<H>  ----------------------------<  88  4.4   |  26  |  1.04    Ca    9.5      07 Aug 2019 14:46    TPro  7.4  /  Alb  4.0  /  TBili  0.6  /  DBili  x   /  AST  16  /  ALT  10  /  AlkPhos  55  -      RADIOLOGY & ADDITIONAL STUDIES:    HCT: No intracranial hemorrhage or acute transcortical infarct. 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     CTA: no stenoses or occlusion seen in carotid or vertebral arteries     CTP:   normal CT perfusion study     -----------------------------------------------------------------------------------------------------------------  IV-tPA (Y/N):    N                              Bolus time:  Reason IV-tPA not given: Pt out of the window    ASSESSMENT/PLAN:    89y Female w/ PMH HTN, CKD, thoracic scoliosis, dementia (alert and oriented x1 at baseline), depression/anxiety, insomnia, presenting with transient R sided facial droop. HCT showed no evidence of acute infarct or hemorrhage, however did show old R sided capsular lacunar infarct. CTA showed no evidence of occlusion or stenosis. tPA was not given, as patient was outside of the window. Patient's symptoms are likely not related to TIA/CVA. Given history of old stroke, recommend starting aspirin 81mg.   Symptoms likely c/w acute encephalopathy vs. underlying dementia.   - management and dispo per ED.

## 2019-08-07 NOTE — ED ADULT TRIAGE NOTE - CHIEF COMPLAINT QUOTE
Pt BIBA from home CO worsening AMS and weakness.  HHA states "She was at physical therapy and was weaker than normal."  PT A&Ox1, baseline per HHA.  EMS denies N/V/D, SOB, Fevers, CP and Dizziness

## 2019-08-07 NOTE — ED PROVIDER NOTE - PROGRESS NOTE DETAILS
Stroke assessment negative. Stroke team Dr Stokes - no further stroke w/u. Add baby ASA to regimen. Dehydration, trop improved w/ hydration. Pt started on abx. Pt has 24 hour home care. BRYCE Denny d/w legal guardian plan, w/u and dc home. Txp pending for d/c home Stroke assessment negative. Stroke team Dr Stokes - no further stroke w/u. Add baby ASA to regimen. Dehydration, trop improved w/ hydration. Trop likely demand in the setting of no EKG changes and no CP. Pt started on abx. Pt has 24 hour home care. BRYCE Denny d/w legal guardian plan, w/u and dc home. Txp pending for d/c home

## 2019-08-07 NOTE — ED PROVIDER NOTE - CLINICAL SUMMARY MEDICAL DECISION MAKING FREE TEXT BOX
Pt BIBA for ? facial droop, none noted on exam. Stroke code initiated in accordance w/ [facility] protocols, but outside window for TPA. DDx includes but not limited to TIA, toxic /metabolic enceph, facial change s/p sleeping on face, dehydration, electrolyte ab, infection, other pathology. Check labs, EKG, UA, CT. Dispo pending w/u, clinical status, neuro recommendations

## 2019-08-07 NOTE — ED PROVIDER NOTE - OBJECTIVE STATEMENT
Pt w/ PMHx HTN, dementia (AOX1 at baseline), w/ 24 hour care, BIBA from NH s/p HHA called EMS. A reports pt was sleeping, home PT arrived, and awoke the pt. Upon awakening the pt, pt was noted to have L sided facial droop by mouth. No unilateral weakness noted. 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 Sx lasted minutes and resolved on their own. Last known normal when pt went down to sleep at 10 am. Pt unable to contribute to hx 2/2 baseline mental status. No recent f/c, URI, GI, or  sx. A w/ pt knows little about pt's PMHx, only been working w/ pt for 4 days.

## 2019-08-07 NOTE — ED PROVIDER NOTE - NSFOLLOWUPINSTRUCTIONS_ED_ALL_ED_FT
You were evaluated in the ED for a possible facial droop at home. There was no evidence of facial droop on exam. You were evaluated by the stroke team, and had a stroke assessment with CT, and it was not felt this was a stroke. It is recommended a baby Aspirin (81 mg) be added to your medication regimen for stroke prevention. Your blood work revealed dehydration, your received IV fluids with improvement. You have a urinary tract infection. You received a dose of IV antibiotics (Ceftriaxone) in the ED and a prescription has been sent to your pharmacy for oral antibiotics (Cefpodoxime). Start the oral antibiotics tomorrow. Follow up with your regular medical doctor.    Urinary Tract Infection    A urinary tract infection (UTI) is an infection of any part of the urinary tract, which includes the kidneys, ureters, bladder, and urethra. Risk factors include ignoring your need to urinate, wiping back to front if female, being an uncircumcised male, and having diabetes or a weak immune system. Symptoms include frequent urination, pain or burning with urination, foul smelling urine, cloudy urine, pain in the lower abdomen, blood in the urine, and fever. If you were prescribed an antibiotic medicine, take it as told by your health care provider. Do not stop taking the antibiotic even if you start to feel better.    SEEK IMMEDIATE MEDICAL CARE IF YOU HAVE ANY OF THE FOLLOWING SYMPTOMS: severe back or abdominal pain, fever, inability to keep fluids or medicine down, dizziness/lightheadedness, or a change in mental status.     Dehydration    WHAT YOU NEED TO KNOW:    Dehydration is a condition that develops when your body does not have enough fluid. You may become dehydrated if you do not drink enough water or lose too much fluid. Fluid loss may also cause loss of electrolytes (minerals), such as sodium.    DISCHARGE INSTRUCTIONS:    Return to the emergency department if:     You have a seizure.      You are confused or cannot think clearly.      You are extremely sleepy, or another person cannot wake you.       You become dizzy or faint when you stand.      You are not able to urinate.      You have trouble breathing.      You have a fast or irregular heartbeat.      Your hands or feet are cold, or your face is pale.     Contact your healthcare provider if:     You have trouble drinking liquids because you are vomiting.      Your symptoms get worse.      You have a fever.       You feel very weak or tired.      You have questions or concerns about your condition or care.    Follow up with your healthcare provider as directed: Write down your questions so you remember to ask them during your visits.     Prevent or manage dehydration:     Drink liquids as directed. Liquids that contain water, sugar, and minerals can help your body hold in fluid and help prevent dehydration. Drink liquids throughout the day, not just when you feel thirsty. Men should drink about 3 liters (13 eight-ounce cups) of liquid each day. Women should drink about 2 liters (9 eight-ounce cups) of liquid each day. Drink even more liquid if you will be outdoors, in the sun for a long time, or exercising.       Stay cool. Limit the time you spend outdoors during the hottest part of the day. Dress in lightweight clothes.       Keep track of how often you urinate. If you urinate less than usual or your urine is darker, drink more liquids. You were evaluated in the ED for a possible facial droop at home. There was no evidence of facial droop on exam. You were evaluated by the stroke team, and had a stroke assessment with CT, and it was not felt this was a stroke. It is recommended a baby Aspirin (81 mg) be added to your medication regimen for stroke prevention. Your blood work revealed dehydration, your received IV fluids with improvement. You have a urinary tract infection. You received a dose of IV antibiotics (Ceftriaxone) in the ED and a prescription has been sent to your pharmacy for oral antibiotics (Cefpodoxime). Start the oral antibiotics tomorrow. Follow up with your regular medical doctor. You have a nodule in the lobe of the lung (see CT results) which should be followed by repeat CT in 6 months. This may be ordered by your primary doctor.     Urinary Tract Infection    A urinary tract infection (UTI) is an infection of any part of the urinary tract, which includes the kidneys, ureters, bladder, and urethra. Risk factors include ignoring your need to urinate, wiping back to front if female, being an uncircumcised male, and having diabetes or a weak immune system. Symptoms include frequent urination, pain or burning with urination, foul smelling urine, cloudy urine, pain in the lower abdomen, blood in the urine, and fever. If you were prescribed an antibiotic medicine, take it as told by your health care provider. Do not stop taking the antibiotic even if you start to feel better.    SEEK IMMEDIATE MEDICAL CARE IF YOU HAVE ANY OF THE FOLLOWING SYMPTOMS: severe back or abdominal pain, fever, inability to keep fluids or medicine down, dizziness/lightheadedness, or a change in mental status.     Dehydration    WHAT YOU NEED TO KNOW:    Dehydration is a condition that develops when your body does not have enough fluid. You may become dehydrated if you do not drink enough water or lose too much fluid. Fluid loss may also cause loss of electrolytes (minerals), such as sodium.    DISCHARGE INSTRUCTIONS:    Return to the emergency department if:     You have a seizure.      You are confused or cannot think clearly.      You are extremely sleepy, or another person cannot wake you.       You become dizzy or faint when you stand.      You are not able to urinate.      You have trouble breathing.      You have a fast or irregular heartbeat.      Your hands or feet are cold, or your face is pale.     Contact your healthcare provider if:     You have trouble drinking liquids because you are vomiting.      Your symptoms get worse.      You have a fever.       You feel very weak or tired.      You have questions or concerns about your condition or care.    Follow up with your healthcare provider as directed: Write down your questions so you remember to ask them during your visits.     Prevent or manage dehydration:     Drink liquids as directed. Liquids that contain water, sugar, and minerals can help your body hold in fluid and help prevent dehydration. Drink liquids throughout the day, not just when you feel thirsty. Men should drink about 3 liters (13 eight-ounce cups) of liquid each day. Women should drink about 2 liters (9 eight-ounce cups) of liquid each day. Drink even more liquid if you will be outdoors, in the sun for a long time, or exercising.       Stay cool. Limit the time you spend outdoors during the hottest part of the day. Dress in lightweight clothes.       Keep track of how often you urinate. If you urinate less than usual or your urine is darker, drink more liquids.

## 2019-08-07 NOTE — ED PROVIDER NOTE - PHYSICAL EXAMINATION
Constitutional: Thin, frail, awake, alert, oriented to person, only. In NAD  ENMT: Airway patent. Dry MM  Eyes: Clear bilaterally, PERRL, EOMI  Cardiac: Normal rate, regular rhythm.  Heart sounds S1, S2.  No murmurs, rubs or gallops. No JVD or LE edema  Respiratory: Breaths sounds equal and clear b/l. No W/R/R. No increased WOB, tachypnea, hypoxia, or accessory mm use. Pt speaks in full sentences.   Gastrointestinal: Abd soft, NT, ND, NABS. No guarding, rebound, or rigidity. No pulsatile abdominal masses. No organomegaly appreciated. No CVAT   Musculoskeletal: Range of motion is not limited  Neuro: Alert and oriented x 1, CN II-XII intact. No facial droop. Clear speech. Chronic contractures or RUE and RLE limit mobility. No pronator drift. 5/5 strength in b/l UE, and LLE. Movement in RLE limited by contracture. NIHSS 9   Skin: Skin normal color for race, warm, dry and intact. No evidence of rash.  Psych: Alert and oriented to person, place, time/situation. normal mood and affect. no apparent risk to self or others.

## 2019-08-07 NOTE — ED PROVIDER NOTE - CARE PLAN
Principal Discharge DX:	Urinary tract infection without hematuria, site unspecified  Secondary Diagnosis:	Dehydration Principal Discharge DX:	Urinary tract infection without hematuria, site unspecified  Secondary Diagnosis:	Dehydration  Secondary Diagnosis:	Lung nodule

## 2019-08-09 LAB
-  AZTREONAM: SIGNIFICANT CHANGE UP
-  CEFEPIME: SIGNIFICANT CHANGE UP
-  GENTAMICIN: SIGNIFICANT CHANGE UP
-  PIPERACILLIN/TAZOBACTAM: SIGNIFICANT CHANGE UP
-  TOBRAMYCIN: SIGNIFICANT CHANGE UP
CULTURE RESULTS: SIGNIFICANT CHANGE UP
METHOD TYPE: SIGNIFICANT CHANGE UP
METHOD TYPE: SIGNIFICANT CHANGE UP
ORGANISM # SPEC MICROSCOPIC CNT: SIGNIFICANT CHANGE UP
SPECIMEN SOURCE: SIGNIFICANT CHANGE UP

## 2019-08-12 DIAGNOSIS — N39.0 URINARY TRACT INFECTION, SITE NOT SPECIFIED: ICD-10-CM

## 2019-08-12 DIAGNOSIS — E86.0 DEHYDRATION: ICD-10-CM

## 2019-08-12 DIAGNOSIS — R53.1 WEAKNESS: ICD-10-CM

## 2019-08-12 DIAGNOSIS — R91.1 SOLITARY PULMONARY NODULE: ICD-10-CM
